# Patient Record
Sex: FEMALE | Race: WHITE
[De-identification: names, ages, dates, MRNs, and addresses within clinical notes are randomized per-mention and may not be internally consistent; named-entity substitution may affect disease eponyms.]

---

## 2017-12-13 ENCOUNTER — HOSPITAL ENCOUNTER (OUTPATIENT)
Dept: HOSPITAL 80 - FIMAGING | Age: 47
End: 2017-12-13
Attending: NURSE PRACTITIONER
Payer: MEDICAID

## 2017-12-13 DIAGNOSIS — R76.11: Primary | ICD-10-CM

## 2019-01-23 NOTE — EDPHY
H & P


Time Seen by Provider: 01/23/19 21:51


HPI/ROS: 





Chief Complaint:  Med clearance, alcohol intoxication





HPI:  48-year-old woman brought in by EMS and police for medical clearance.  

Patient was found a intoxicated.  She is ambulating unassisted.  She began 

getting combative with police.  She required hobbling.  Patient brought in for 

evaluation.  She is awake and alert.  Slurred speech.  She denies falls or head 

injuries.  Denies any fevers or chills.  No cough.  Admits to drinking alcohol.

  Denies any other complaints at this time.





ROS:  10 systems were reviewed and were negative except those elements noted in 

the HPI.





PMH:  Denies





Social History:  Positive smoking, daily alcohol





Family History: non-contributory





Physical Exam:


Gen: Awake, Alert, Airway Intact


HEENT:


     Head: Atraumatic


     Eyes: PERRLA, EOMI


     Ears: No hemotympanum


     Nose: No epistaxis


     Mouth: Normal dentition, Airway patent


     Face: No deformity


Neck: non-tender, no stepoff, Full ROM without pain


Chest:  non-tender, lungs CTA


Heart: normal heart tones


Abd: soft, non-tender, atraumatic


Pelvis: non-tender, stable to AP and Lateral compression


Back: atraumatic, no midline tenderness


Ext: atramatic, full ROM


Skin: no rash


Neuro: CN II-XII intact, Strength 5/5 in all extremities, sensation intact in 

all extremities

















- Medical/Surgical History


Hx Asthma: No


Hx Chronic Respiratory Disease: No


Hx Diabetes: No


Hx Cardiac Disease: No


Hx Renal Disease: No


Hx Cirrhosis: No


Hx Alcoholism: No


Hx HIV/AIDS: No


Hx Splenectomy or Spleen Trauma: No


Other PMH: none





- Social History


Smoking Status: Current some day smoker


Constitutional: 


 Initial Vital Signs











Temperature (C)  36.6 C   01/23/19 21:52


 


Heart Rate  100   01/23/19 21:52


 


Respiratory Rate  16   01/23/19 21:52


 


Blood Pressure  111/84 H  01/23/19 21:52


 


O2 Sat (%)  94   01/23/19 21:52








 











O2 Delivery Mode               Room Air














Allergies/Adverse Reactions: 


 





latex Allergy (Mild, Verified 01/23/19 21:52)


 Itching








Home Medications: 














 Medication  Instructions  Recorded


 


NK [No Known Home Meds]  01/23/19














Medical Decision Making


ED Course/Re-evaluation: 





48-year-old woman brought in for medical clearance.  She is intoxicated.  There 

is no findings on physical exam with the exception of her intoxication.  She is 

atraumatic.  She is awake alert and answering questions.  She is ambulating 

unassisted.  She is medically clear for residential.





Departure





- Departure


Disposition: Law Enforcement/Court/intermediate


Clinical Impression: 


 Alcoholic intoxication





Condition: Good


Instructions:  Alcohol Intoxication (ED)


Additional Instructions: 


MEDICALLY CLEAR FOR halfway


Referrals: 


NONE *PRIMARY CARE P,. [Primary Care Provider] - As per Instructions
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
